# Patient Record
Sex: MALE | Race: BLACK OR AFRICAN AMERICAN | NOT HISPANIC OR LATINO | Employment: PART TIME | URBAN - METROPOLITAN AREA
[De-identification: names, ages, dates, MRNs, and addresses within clinical notes are randomized per-mention and may not be internally consistent; named-entity substitution may affect disease eponyms.]

---

## 2018-06-12 ENCOUNTER — HISTORICAL (OUTPATIENT)
Dept: ADMINISTRATIVE | Facility: HOSPITAL | Age: 53
End: 2018-06-12

## 2018-08-08 ENCOUNTER — HISTORICAL (OUTPATIENT)
Dept: ADMINISTRATIVE | Facility: HOSPITAL | Age: 53
End: 2018-08-08

## 2021-01-01 ENCOUNTER — HISTORICAL (OUTPATIENT)
Dept: ADMINISTRATIVE | Facility: HOSPITAL | Age: 56
End: 2021-01-01

## 2021-01-01 LAB — SARS-COV-2 RNA RESP QL NAA+PROBE: DETECTED

## 2022-01-01 ENCOUNTER — HOSPITAL ENCOUNTER (INPATIENT)
Facility: HOSPITAL | Age: 57
LOS: 1 days | DRG: 917 | End: 2022-11-15
Attending: EMERGENCY MEDICINE | Admitting: HOSPITALIST
Payer: MEDICAID

## 2022-01-01 ENCOUNTER — HOSPITAL ENCOUNTER (EMERGENCY)
Facility: HOSPITAL | Age: 57
Discharge: HOME | End: 2022-11-09
Attending: EMERGENCY MEDICINE
Payer: MEDICAID

## 2022-01-01 ENCOUNTER — HISTORICAL (OUTPATIENT)
Dept: ADMINISTRATIVE | Facility: HOSPITAL | Age: 57
End: 2022-01-01
Payer: MEDICAID

## 2022-01-01 ENCOUNTER — HOSPITAL ENCOUNTER (EMERGENCY)
Facility: HOSPITAL | Age: 57
Discharge: HOME | End: 2022-10-03
Attending: STUDENT IN AN ORGANIZED HEALTH CARE EDUCATION/TRAINING PROGRAM
Payer: MEDICAID

## 2022-01-01 VITALS
SYSTOLIC BLOOD PRESSURE: 151 MMHG | HEIGHT: 67 IN | DIASTOLIC BLOOD PRESSURE: 81 MMHG | SYSTOLIC BLOOD PRESSURE: 141 MMHG | BODY MASS INDEX: 31.63 KG/M2 | WEIGHT: 204.56 LBS | WEIGHT: 201.5 LBS | HEIGHT: 67 IN | BODY MASS INDEX: 32.11 KG/M2 | DIASTOLIC BLOOD PRESSURE: 88 MMHG | SYSTOLIC BLOOD PRESSURE: 142 MMHG | HEIGHT: 67 IN | WEIGHT: 188.25 LBS | BODY MASS INDEX: 29.55 KG/M2 | DIASTOLIC BLOOD PRESSURE: 76 MMHG

## 2022-01-01 VITALS
DIASTOLIC BLOOD PRESSURE: 81 MMHG | SYSTOLIC BLOOD PRESSURE: 134 MMHG | OXYGEN SATURATION: 98 % | TEMPERATURE: 99 F | RESPIRATION RATE: 16 BRPM | HEART RATE: 78 BPM

## 2022-01-01 VITALS
RESPIRATION RATE: 30 BRPM | OXYGEN SATURATION: 75 % | BODY MASS INDEX: 34.01 KG/M2 | HEIGHT: 66 IN | DIASTOLIC BLOOD PRESSURE: 37 MMHG | TEMPERATURE: 98 F | WEIGHT: 211.63 LBS | SYSTOLIC BLOOD PRESSURE: 45 MMHG

## 2022-01-01 VITALS
HEART RATE: 77 BPM | RESPIRATION RATE: 16 BRPM | TEMPERATURE: 98 F | SYSTOLIC BLOOD PRESSURE: 117 MMHG | OXYGEN SATURATION: 95 % | DIASTOLIC BLOOD PRESSURE: 72 MMHG

## 2022-01-01 DIAGNOSIS — S30.1XXA CONTUSION OF GROIN, INITIAL ENCOUNTER: Primary | ICD-10-CM

## 2022-01-01 DIAGNOSIS — R52 PAIN: ICD-10-CM
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
## 2022-01-01 DIAGNOSIS — K08.89 PAIN, DENTAL: Primary | ICD-10-CM

## 2022-01-01 DIAGNOSIS — R79.89 ELEVATED TROPONIN: ICD-10-CM

## 2022-01-01 DIAGNOSIS — K02.9 PAIN DUE TO DENTAL CARIES: ICD-10-CM

## 2022-01-01 DIAGNOSIS — K92.2 UPPER GI BLEED: ICD-10-CM

## 2022-01-01 DIAGNOSIS — S39.012A STRAIN, SACRAL, INITIAL ENCOUNTER: ICD-10-CM

## 2022-01-01 DIAGNOSIS — F14.11 HISTORY OF COCAINE ABUSE: ICD-10-CM

## 2022-01-01 DIAGNOSIS — K72.00 SHOCK LIVER: ICD-10-CM

## 2022-01-01 DIAGNOSIS — K04.7 DENTAL ABSCESS: ICD-10-CM

## 2022-01-01 DIAGNOSIS — I46.9 CARDIAC ARREST: Primary | ICD-10-CM

## 2022-01-01 DIAGNOSIS — U07.1 COVID-19 VIRUS DETECTED: ICD-10-CM

## 2022-01-01 LAB
ABORH RETYPE: NORMAL
ABS NEUT (OLG): 22.88 X10(3)/MCL (ref 2.1–9.2)
ABS NEUT (OLG): 3.85 X10(3)/MCL (ref 2.1–9.2)
ALBUMIN SERPL-MCNC: 2.5 GM/DL (ref 3.5–5)
ALBUMIN SERPL-MCNC: 2.7 GM/DL (ref 3.5–5)
ALBUMIN SERPL-MCNC: 3.7 GM/DL (ref 3.5–5)
ALBUMIN/GLOB SERPL: 0.8 RATIO (ref 1.1–2)
ALBUMIN/GLOB SERPL: 0.9 RATIO (ref 1.1–2)
ALBUMIN/GLOB SERPL: 1.2 RATIO (ref 1.1–2)
ALP SERPL-CCNC: 118 UNIT/L (ref 40–150)
ALP SERPL-CCNC: 192 UNIT/L (ref 40–150)
ALP SERPL-CCNC: 73 UNIT/L (ref 40–150)
ALT SERPL-CCNC: 16 UNIT/L (ref 0–55)
ALT SERPL-CCNC: 2898 UNIT/L (ref 0–55)
ALT SERPL-CCNC: 3362 UNIT/L (ref 0–55)
AMPHET UR QL SCN: NEGATIVE
ANION GAP SERPL CALC-SCNC: 21 MMOL/L (ref 8–16)
ANION GAP SERPL CALC-SCNC: 25 MEQ/L
APPEARANCE UR: CLEAR
APTT PPP: 197.1 SECONDS (ref 23.2–33.7)
AST SERPL-CCNC: 22 UNIT/L (ref 5–34)
AST SERPL-CCNC: 2537 UNIT/L (ref 5–34)
AST SERPL-CCNC: 3319 UNIT/L (ref 5–34)
BACTERIA #/AREA URNS AUTO: NORMAL /HPF
BARBITURATE SCN PRESENT UR: NEGATIVE
BASOPHILS # BLD AUTO: 0.19 X10(3)/MCL (ref 0–0.2)
BASOPHILS NFR BLD AUTO: 1.3 %
BENZODIAZ UR QL SCN: POSITIVE
BILIRUB UR QL STRIP.AUTO: NEGATIVE MG/DL
BILIRUBIN DIRECT+TOT PNL SERPL-MCNC: 0.3 MG/DL
BILIRUBIN DIRECT+TOT PNL SERPL-MCNC: 0.5 MG/DL
BILIRUBIN DIRECT+TOT PNL SERPL-MCNC: 0.6 MG/DL
BUN SERPL-MCNC: 11 MG/DL (ref 6–30)
BUN SERPL-MCNC: 15.3 MG/DL (ref 8.4–25.7)
BUN SERPL-MCNC: 15.4 MG/DL (ref 8.4–25.7)
BUN SERPL-MCNC: 17.7 MG/DL (ref 8.4–25.7)
BUN SERPL-MCNC: 9.7 MG/DL (ref 8.4–25.7)
CALCIUM SERPL-MCNC: 7.1 MG/DL (ref 8.4–10.2)
CALCIUM SERPL-MCNC: 7.6 MG/DL (ref 8.4–10.2)
CALCIUM SERPL-MCNC: 8.6 MG/DL (ref 8.4–10.2)
CALCIUM SERPL-MCNC: 9.2 MG/DL (ref 8.4–10.2)
CANNABINOIDS UR QL SCN: NEGATIVE
CHLORIDE SERPL-SCNC: 101 MMOL/L (ref 95–110)
CHLORIDE SERPL-SCNC: 103 MMOL/L (ref 98–107)
CHLORIDE SERPL-SCNC: 103 MMOL/L (ref 98–107)
CHLORIDE SERPL-SCNC: 107 MMOL/L (ref 98–107)
CHLORIDE SERPL-SCNC: 108 MMOL/L (ref 98–107)
CK SERPL-CCNC: 670 U/L (ref 30–200)
CO2 SERPL-SCNC: 10 MMOL/L (ref 22–29)
CO2 SERPL-SCNC: 15 MMOL/L (ref 22–29)
CO2 SERPL-SCNC: 25 MMOL/L (ref 22–29)
CO2 SERPL-SCNC: 8 MMOL/L (ref 22–29)
COCAINE UR QL SCN: POSITIVE
COLOR UR AUTO: YELLOW
CORRECTED TEMPERATURE (PCO2): 34 MMHG (ref 35–45)
CORRECTED TEMPERATURE (PCO2): 64 MMHG (ref 19–50)
CORRECTED TEMPERATURE (PH): 6.92 (ref 7.29–7.61)
CORRECTED TEMPERATURE (PH): 6.93 (ref 7.29–7.61)
CORRECTED TEMPERATURE (PO2): 142 MMHG (ref 80–100)
CORRECTED TEMPERATURE (PO2): 243 MMHG (ref 80–100)
CREAT SERPL-MCNC: 0.75 MG/DL (ref 0.73–1.18)
CREAT SERPL-MCNC: 1.3 MG/DL (ref 0.5–1.4)
CREAT SERPL-MCNC: 1.33 MG/DL (ref 0.73–1.18)
CREAT SERPL-MCNC: 1.52 MG/DL (ref 0.73–1.18)
CREAT SERPL-MCNC: 2.1 MG/DL (ref 0.73–1.18)
CREAT/UREA NIT SERPL: 8
EOSINOPHIL # BLD AUTO: 0.58 X10(3)/MCL (ref 0–0.9)
EOSINOPHIL NFR BLD AUTO: 4 %
EOSINOPHIL NFR BLD MANUAL: 0.35 X10(3)/MCL (ref 0–0.9)
EOSINOPHIL NFR BLD MANUAL: 2 %
ERYTHROCYTE [DISTWIDTH] IN BLOOD BY AUTOMATED COUNT: 13.2 % (ref 11.5–17)
ERYTHROCYTE [DISTWIDTH] IN BLOOD BY AUTOMATED COUNT: 13.6 % (ref 11.5–17)
ERYTHROCYTE [DISTWIDTH] IN BLOOD BY AUTOMATED COUNT: 14.4 % (ref 11.5–17)
ETHANOL SERPL-MCNC: 28 MG/DL
FENTANYL UR QL SCN: NEGATIVE
GFR SERPLBLD CREATININE-BSD FMLA CKD-EPI: 36 MLS/MIN/1.73/M2
GFR SERPLBLD CREATININE-BSD FMLA CKD-EPI: 54 MLS/MIN/1.73/M2
GFR SERPLBLD CREATININE-BSD FMLA CKD-EPI: >60 MLS/MIN/1.73/M2
GFR SERPLBLD CREATININE-BSD FMLA CKD-EPI: >60 MLS/MIN/1.73/M2
GLOBULIN SER-MCNC: 2.7 GM/DL (ref 2.4–3.5)
GLOBULIN SER-MCNC: 3.2 GM/DL (ref 2.4–3.5)
GLOBULIN SER-MCNC: 3.4 GM/DL (ref 2.4–3.5)
GLUCOSE SERPL-MCNC: 106 MG/DL (ref 74–100)
GLUCOSE SERPL-MCNC: 116 MG/DL (ref 74–100)
GLUCOSE SERPL-MCNC: 230 MG/DL (ref 74–100)
GLUCOSE SERPL-MCNC: 245 MG/DL (ref 74–100)
GLUCOSE SERPL-MCNC: 83 MG/DL (ref 74–100)
GLUCOSE SERPL-MCNC: 90 MG/DL (ref 70–110)
GLUCOSE UR QL STRIP.AUTO: NEGATIVE MG/DL
GROUP & RH: NORMAL
HCO3 UR-SCNC: 13.4 MMOL/L
HCO3 UR-SCNC: 7 MMOL/L
HCT VFR BLD AUTO: 36.1 % (ref 42–52)
HCT VFR BLD AUTO: 38 % (ref 42–52)
HCT VFR BLD AUTO: 39.1 % (ref 42–52)
HCT VFR BLD AUTO: 41.8 % (ref 42–52)
HCT VFR BLD CALC: 42 %PCV (ref 36–54)
HGB BLD-MCNC: 11.1 GM/DL (ref 14–18)
HGB BLD-MCNC: 11.4 G/DL (ref 12–16)
HGB BLD-MCNC: 11.8 G/DL (ref 12–16)
HGB BLD-MCNC: 12.3 GM/DL (ref 14–18)
HGB BLD-MCNC: 12.7 GM/DL (ref 14–18)
HGB BLD-MCNC: 13.2 GM/DL (ref 14–18)
HGB BLD-MCNC: 14 G/DL
IMM GRANULOCYTES # BLD AUTO: 0.09 X10(3)/MCL (ref 0–0.04)
IMM GRANULOCYTES # BLD AUTO: 0.47 X10(3)/MCL (ref 0–0.04)
IMM GRANULOCYTES # BLD AUTO: 1.39 X10(3)/MCL (ref 0–0.04)
IMM GRANULOCYTES NFR BLD AUTO: 0.6 %
IMM GRANULOCYTES NFR BLD AUTO: 1.8 %
IMM GRANULOCYTES NFR BLD AUTO: 7.9 %
INDIRECT COOMBS GEL: NORMAL
INR BLD: 5.71 (ref 0–1.3)
INSTRUMENT WBC (OLG): 17.5 X10(3)/MCL
INSTRUMENT WBC (OLG): 26 X10(3)/MCL
KETONES UR QL STRIP.AUTO: NEGATIVE MG/DL
LACTATE SERPL-SCNC: 16.1 MMOL/L (ref 0.5–2.2)
LACTATE SERPL-SCNC: 17 MMOL/L (ref 0.5–2.2)
LACTATE SERPL-SCNC: 18.6 MMOL/L (ref 0.5–2.2)
LACTATE SERPL-SCNC: 9.7 MMOL/L (ref 0.5–2.2)
LEUKOCYTE ESTERASE UR QL STRIP.AUTO: NEGATIVE UNIT/L
LYMPHOCYTES # BLD AUTO: 4.23 X10(3)/MCL (ref 0.6–4.6)
LYMPHOCYTES NFR BLD AUTO: 29.3 %
LYMPHOCYTES NFR BLD MANUAL: 1.56 X10(3)/MCL
LYMPHOCYTES NFR BLD MANUAL: 13.12 X10(3)/MCL
LYMPHOCYTES NFR BLD MANUAL: 6 %
LYMPHOCYTES NFR BLD MANUAL: 75 %
MACROCYTES BLD QL SMEAR: ABNORMAL
MAGNESIUM SERPL-MCNC: 2.5 MG/DL (ref 1.6–2.6)
MCH RBC QN AUTO: 30.8 PG (ref 27–31)
MCH RBC QN AUTO: 30.9 PG (ref 27–31)
MCH RBC QN AUTO: 31.2 PG (ref 27–31)
MCHC RBC AUTO-ENTMCNC: 30.4 MG/DL (ref 33–36)
MCHC RBC AUTO-ENTMCNC: 30.7 MG/DL (ref 33–36)
MCHC RBC AUTO-ENTMCNC: 33.8 MG/DL (ref 33–36)
MCV RBC AUTO: 100.6 FL (ref 80–94)
MCV RBC AUTO: 101.2 FL (ref 80–94)
MCV RBC AUTO: 92.4 FL (ref 80–94)
MDMA UR QL SCN: NEGATIVE
METAMYELOCYTES NFR BLD MANUAL: 3 %
MONOCYTES # BLD AUTO: 1.24 X10(3)/MCL (ref 0.1–1.3)
MONOCYTES NFR BLD AUTO: 8.6 %
MONOCYTES NFR BLD MANUAL: 0.17 X10(3)/MCL (ref 0.1–1.3)
MONOCYTES NFR BLD MANUAL: 1 %
MONOCYTES NFR BLD MANUAL: 1.82 X10(3)/MCL (ref 0.1–1.3)
MONOCYTES NFR BLD MANUAL: 7 %
MYELOCYTES NFR BLD MANUAL: 1 %
MYELOCYTES NFR BLD MANUAL: 1 %
NEUTROPHILS # BLD AUTO: 8.1 X10(3)/MCL (ref 2.1–9.2)
NEUTROPHILS NFR BLD AUTO: 56.2 %
NEUTROPHILS NFR BLD MANUAL: 21 %
NEUTROPHILS NFR BLD MANUAL: 84 %
NITRITE UR QL STRIP.AUTO: NEGATIVE
NRBC BLD AUTO-RTO: 0 %
NRBC BLD AUTO-RTO: 0.5 %
NRBC BLD AUTO-RTO: 0.7 %
OPIATES UR QL SCN: NEGATIVE
PCO2 BLDA: 34 MMHG (ref 35–45)
PCO2 BLDA: 64 MMHG (ref 19–50)
PCP UR QL: NEGATIVE
PH SMN: 6.92 [PH] (ref 7.29–7.61)
PH SMN: 6.93 [PH] (ref 7.29–7.61)
PH UR STRIP.AUTO: 5.5 [PH]
PH UR: 5.5 [PH] (ref 3–11)
PHOSPHATE SERPL-MCNC: 10.3 MG/DL (ref 2.3–4.7)
PLATELET # BLD AUTO: 144 X10(3)/MCL (ref 130–400)
PLATELET # BLD AUTO: 269 X10(3)/MCL (ref 130–400)
PLATELET # BLD AUTO: 354 X10(3)/MCL (ref 130–400)
PLATELET # BLD EST: ADEQUATE 10*3/UL
PLATELET # BLD EST: NORMAL 10*3/UL
PMV BLD AUTO: 10.3 FL (ref 7.4–10.4)
PMV BLD AUTO: 11.4 FL (ref 7.4–10.4)
PMV BLD AUTO: 9.6 FL (ref 7.4–10.4)
PO2 BLDA: 142 MMHG (ref 80–100)
PO2 BLDA: 243 MMHG (ref 80–100)
POC BASE DEFICIT: -19.1 MMOL/L (ref -2–2)
POC BASE DEFICIT: -24.7 MMOL/L (ref -2–2)
POC COHB: 0.9 %
POC COHB: 8.1 %
POC IONIZED CALCIUM: 0.92 MMOL/L (ref 1.1–1.2)
POC IONIZED CALCIUM: 1.07 MMOL/L (ref 1.06–1.42)
POC IONIZED CALCIUM: 1.14 MMOL/L (ref 1.12–1.23)
POC METHB: 0.3 % (ref 0.4–1.5)
POC METHB: 0.3 % (ref 0.4–1.5)
POC O2HB: 90.7 % (ref 94–97)
POC O2HB: 97.3 % (ref 94–97)
POC SATURATED O2: 97.1 %
POC SATURATED O2: 99.5 %
POC TCO2 (MEASURED): 25 MMOL/L (ref 23–29)
POC TEMPERATURE: 37 °C
POC TEMPERATURE: 37 °C
POCT GLUCOSE: 97 MG/DL (ref 70–110)
POTASSIUM BLD-SCNC: 2.7 MMOL/L (ref 3.5–5)
POTASSIUM BLD-SCNC: 3.7 MMOL/L (ref 3.5–5)
POTASSIUM BLD-SCNC: 3.7 MMOL/L (ref 3.5–5.1)
POTASSIUM SERPL-SCNC: 3.6 MMOL/L (ref 3.5–5.1)
POTASSIUM SERPL-SCNC: 3.8 MMOL/L (ref 3.5–5.1)
POTASSIUM SERPL-SCNC: 4 MMOL/L (ref 3.5–5.1)
POTASSIUM SERPL-SCNC: 4.2 MMOL/L (ref 3.5–5.1)
PROT SERPL-MCNC: 5.2 GM/DL (ref 6.4–8.3)
PROT SERPL-MCNC: 6.1 GM/DL (ref 6.4–8.3)
PROT SERPL-MCNC: 6.9 GM/DL (ref 6.4–8.3)
PROT UR QL STRIP.AUTO: NEGATIVE MG/DL
PROTHROMBIN TIME: 50 SECONDS (ref 12.5–14.5)
RBC # BLD AUTO: 3.59 X10(6)/MCL (ref 4.7–6.1)
RBC # BLD AUTO: 4.13 X10(6)/MCL (ref 4.7–6.1)
RBC # BLD AUTO: 4.23 X10(6)/MCL (ref 4.7–6.1)
RBC #/AREA URNS AUTO: <5 /HPF
RBC MORPH BLD: ABNORMAL
RBC MORPH BLD: NORMAL
RBC UR QL AUTO: ABNORMAL UNIT/L
SAMPLE: ABNORMAL
SODIUM BLD-SCNC: 132 MMOL/L (ref 137–145)
SODIUM BLD-SCNC: 140 MMOL/L (ref 137–145)
SODIUM BLD-SCNC: 142 MMOL/L (ref 136–145)
SODIUM SERPL-SCNC: 136 MMOL/L (ref 136–145)
SODIUM SERPL-SCNC: 138 MMOL/L (ref 136–145)
SODIUM SERPL-SCNC: 138 MMOL/L (ref 136–145)
SODIUM SERPL-SCNC: 144 MMOL/L (ref 136–145)
SP GR UR STRIP.AUTO: 1.02 (ref 1–1.03)
SPECIFIC GRAVITY, URINE AUTO (.000) (OHS): 1.02 (ref 1–1.03)
SPECIMEN SOURCE: ABNORMAL
SPECIMEN SOURCE: ABNORMAL
SQUAMOUS #/AREA URNS AUTO: <5 /HPF
TROPONIN I SERPL-MCNC: 0.2 NG/ML (ref 0–0.04)
TROPONIN I SERPL-MCNC: 195.03 NG/ML (ref 0–0.04)
TROPONIN I SERPL-MCNC: 66.86 NG/ML (ref 0–0.04)
UROBILINOGEN UR STRIP-ACNC: 0.2 MG/DL
WBC # SPEC AUTO: 14.5 X10(3)/MCL (ref 4.5–11.5)
WBC # SPEC AUTO: 17.5 X10(3)/MCL (ref 4.5–11.5)
WBC # SPEC AUTO: 26.1 X10(3)/MCL (ref 4.5–11.5)
WBC #/AREA URNS AUTO: <5 /HPF

## 2022-01-01 PROCEDURE — P9016 RBC LEUKOCYTES REDUCED: HCPCS | Performed by: EMERGENCY MEDICINE

## 2022-01-01 PROCEDURE — 93010 EKG 12-LEAD: ICD-10-PCS | Mod: 76,,, | Performed by: INTERNAL MEDICINE

## 2022-01-01 PROCEDURE — 63600175 PHARM REV CODE 636 W HCPCS: Performed by: INTERNAL MEDICINE

## 2022-01-01 PROCEDURE — 94761 N-INVAS EAR/PLS OXIMETRY MLT: CPT

## 2022-01-01 PROCEDURE — 63600175 PHARM REV CODE 636 W HCPCS: Performed by: HOSPITALIST

## 2022-01-01 PROCEDURE — 36569 INSJ PICC 5 YR+ W/O IMAGING: CPT

## 2022-01-01 PROCEDURE — 36415 COLL VENOUS BLD VENIPUNCTURE: CPT | Performed by: STUDENT IN AN ORGANIZED HEALTH CARE EDUCATION/TRAINING PROGRAM

## 2022-01-01 PROCEDURE — 83605 ASSAY OF LACTIC ACID: CPT | Performed by: STUDENT IN AN ORGANIZED HEALTH CARE EDUCATION/TRAINING PROGRAM

## 2022-01-01 PROCEDURE — 82803 BLOOD GASES ANY COMBINATION: CPT

## 2022-01-01 PROCEDURE — 63600175 PHARM REV CODE 636 W HCPCS: Performed by: EMERGENCY MEDICINE

## 2022-01-01 PROCEDURE — 27200966 HC CLOSED SUCTION SYSTEM

## 2022-01-01 PROCEDURE — 25000003 PHARM REV CODE 250: Performed by: EMERGENCY MEDICINE

## 2022-01-01 PROCEDURE — 86923 COMPATIBILITY TEST ELECTRIC: CPT | Performed by: EMERGENCY MEDICINE

## 2022-01-01 PROCEDURE — C1751 CATH, INF, PER/CENT/MIDLINE: HCPCS

## 2022-01-01 PROCEDURE — 63600175 PHARM REV CODE 636 W HCPCS: Mod: JA | Performed by: EMERGENCY MEDICINE

## 2022-01-01 PROCEDURE — C9113 INJ PANTOPRAZOLE SODIUM, VIA: HCPCS | Performed by: EMERGENCY MEDICINE

## 2022-01-01 PROCEDURE — 85027 COMPLETE CBC AUTOMATED: CPT | Performed by: EMERGENCY MEDICINE

## 2022-01-01 PROCEDURE — 85014 HEMATOCRIT: CPT | Performed by: STUDENT IN AN ORGANIZED HEALTH CARE EDUCATION/TRAINING PROGRAM

## 2022-01-01 PROCEDURE — 84100 ASSAY OF PHOSPHORUS: CPT | Performed by: STUDENT IN AN ORGANIZED HEALTH CARE EDUCATION/TRAINING PROGRAM

## 2022-01-01 PROCEDURE — 93010 ELECTROCARDIOGRAM REPORT: CPT | Mod: ,,, | Performed by: INTERNAL MEDICINE

## 2022-01-01 PROCEDURE — 82947 ASSAY GLUCOSE BLOOD QUANT: CPT | Performed by: STUDENT IN AN ORGANIZED HEALTH CARE EDUCATION/TRAINING PROGRAM

## 2022-01-01 PROCEDURE — 94003 VENT MGMT INPAT SUBQ DAY: CPT

## 2022-01-01 PROCEDURE — 99900026 HC AIRWAY MAINTENANCE (STAT)

## 2022-01-01 PROCEDURE — 80053 COMPREHEN METABOLIC PANEL: CPT | Performed by: EMERGENCY MEDICINE

## 2022-01-01 PROCEDURE — 99284 EMERGENCY DEPT VISIT MOD MDM: CPT

## 2022-01-01 PROCEDURE — 27201640 HC PAD, ARTICGEL

## 2022-01-01 PROCEDURE — 31500 INSERT EMERGENCY AIRWAY: CPT

## 2022-01-01 PROCEDURE — 25000003 PHARM REV CODE 250: Performed by: STUDENT IN AN ORGANIZED HEALTH CARE EDUCATION/TRAINING PROGRAM

## 2022-01-01 PROCEDURE — 94002 VENT MGMT INPAT INIT DAY: CPT

## 2022-01-01 PROCEDURE — 99900035 HC TECH TIME PER 15 MIN (STAT)

## 2022-01-01 PROCEDURE — 63600175 PHARM REV CODE 636 W HCPCS: Performed by: STUDENT IN AN ORGANIZED HEALTH CARE EDUCATION/TRAINING PROGRAM

## 2022-01-01 PROCEDURE — 86850 RBC ANTIBODY SCREEN: CPT | Performed by: EMERGENCY MEDICINE

## 2022-01-01 PROCEDURE — 85025 COMPLETE CBC W/AUTO DIFF WBC: CPT | Performed by: PHYSICIAN ASSISTANT

## 2022-01-01 PROCEDURE — A4216 STERILE WATER/SALINE, 10 ML: HCPCS | Performed by: STUDENT IN AN ORGANIZED HEALTH CARE EDUCATION/TRAINING PROGRAM

## 2022-01-01 PROCEDURE — 84484 ASSAY OF TROPONIN QUANT: CPT | Performed by: STUDENT IN AN ORGANIZED HEALTH CARE EDUCATION/TRAINING PROGRAM

## 2022-01-01 PROCEDURE — 85027 COMPLETE CBC AUTOMATED: CPT | Performed by: STUDENT IN AN ORGANIZED HEALTH CARE EDUCATION/TRAINING PROGRAM

## 2022-01-01 PROCEDURE — 80053 COMPREHEN METABOLIC PANEL: CPT | Performed by: PHYSICIAN ASSISTANT

## 2022-01-01 PROCEDURE — 80053 COMPREHEN METABOLIC PANEL: CPT | Performed by: STUDENT IN AN ORGANIZED HEALTH CARE EDUCATION/TRAINING PROGRAM

## 2022-01-01 PROCEDURE — 36600 WITHDRAWAL OF ARTERIAL BLOOD: CPT

## 2022-01-01 PROCEDURE — 82962 GLUCOSE BLOOD TEST: CPT

## 2022-01-01 PROCEDURE — 25000003 PHARM REV CODE 250

## 2022-01-01 PROCEDURE — 36592 COLLECT BLOOD FROM PICC: CPT | Performed by: STUDENT IN AN ORGANIZED HEALTH CARE EDUCATION/TRAINING PROGRAM

## 2022-01-01 PROCEDURE — 82077 ASSAY SPEC XCP UR&BREATH IA: CPT | Performed by: EMERGENCY MEDICINE

## 2022-01-01 PROCEDURE — 85730 THROMBOPLASTIN TIME PARTIAL: CPT | Performed by: EMERGENCY MEDICINE

## 2022-01-01 PROCEDURE — 27000221 HC OXYGEN, UP TO 24 HOURS

## 2022-01-01 PROCEDURE — 85018 HEMOGLOBIN: CPT | Performed by: STUDENT IN AN ORGANIZED HEALTH CARE EDUCATION/TRAINING PROGRAM

## 2022-01-01 PROCEDURE — 25000003 PHARM REV CODE 250: Performed by: INTERNAL MEDICINE

## 2022-01-01 PROCEDURE — 96375 TX/PRO/DX INJ NEW DRUG ADDON: CPT

## 2022-01-01 PROCEDURE — 96372 THER/PROPH/DIAG INJ SC/IM: CPT | Performed by: EMERGENCY MEDICINE

## 2022-01-01 PROCEDURE — 85610 PROTHROMBIN TIME: CPT | Performed by: EMERGENCY MEDICINE

## 2022-01-01 PROCEDURE — 37799 UNLISTED PX VASCULAR SURGERY: CPT

## 2022-01-01 PROCEDURE — 96374 THER/PROPH/DIAG INJ IV PUSH: CPT

## 2022-01-01 PROCEDURE — 99291 CRITICAL CARE FIRST HOUR: CPT | Mod: 25

## 2022-01-01 PROCEDURE — 82550 ASSAY OF CK (CPK): CPT | Performed by: STUDENT IN AN ORGANIZED HEALTH CARE EDUCATION/TRAINING PROGRAM

## 2022-01-01 PROCEDURE — 99285 EMERGENCY DEPT VISIT HI MDM: CPT | Mod: 25

## 2022-01-01 PROCEDURE — 20000000 HC ICU ROOM

## 2022-01-01 PROCEDURE — 80048 BASIC METABOLIC PNL TOTAL CA: CPT | Performed by: STUDENT IN AN ORGANIZED HEALTH CARE EDUCATION/TRAINING PROGRAM

## 2022-01-01 PROCEDURE — 36430 TRANSFUSION BLD/BLD COMPNT: CPT

## 2022-01-01 PROCEDURE — 93005 ELECTROCARDIOGRAM TRACING: CPT

## 2022-01-01 PROCEDURE — 84484 ASSAY OF TROPONIN QUANT: CPT | Performed by: EMERGENCY MEDICINE

## 2022-01-01 PROCEDURE — 81001 URINALYSIS AUTO W/SCOPE: CPT | Performed by: PHYSICIAN ASSISTANT

## 2022-01-01 PROCEDURE — 80307 DRUG TEST PRSMV CHEM ANLYZR: CPT | Performed by: EMERGENCY MEDICINE

## 2022-01-01 PROCEDURE — 25500020 PHARM REV CODE 255: Performed by: INTERNAL MEDICINE

## 2022-01-01 PROCEDURE — 80047 BASIC METABLC PNL IONIZED CA: CPT

## 2022-01-01 PROCEDURE — 93010 ELECTROCARDIOGRAM REPORT: CPT | Mod: 76,,, | Performed by: INTERNAL MEDICINE

## 2022-01-01 PROCEDURE — 83735 ASSAY OF MAGNESIUM: CPT | Performed by: STUDENT IN AN ORGANIZED HEALTH CARE EDUCATION/TRAINING PROGRAM

## 2022-01-01 RX ORDER — EPINEPHRINE 1 MG/ML
INJECTION, SOLUTION, CONCENTRATE INTRAVENOUS
Status: DISPENSED
Start: 2022-01-01 | End: 2022-01-01

## 2022-01-01 RX ORDER — CHLORHEXIDINE GLUCONATE ORAL RINSE 1.2 MG/ML
15 SOLUTION DENTAL 2 TIMES DAILY
Qty: 473 ML | Refills: 0 | Status: SHIPPED | OUTPATIENT
Start: 2022-01-01 | End: 2022-01-01

## 2022-01-01 RX ORDER — HYDROCODONE BITARTRATE AND ACETAMINOPHEN 7.5; 325 MG/1; MG/1
1 TABLET ORAL EVERY 6 HOURS PRN
Status: DISCONTINUED | OUTPATIENT
Start: 2022-01-01 | End: 2022-01-01 | Stop reason: HOSPADM

## 2022-01-01 RX ORDER — HYDROCODONE BITARTRATE AND ACETAMINOPHEN 5; 325 MG/1; MG/1
1 TABLET ORAL EVERY 6 HOURS PRN
Qty: 12 TABLET | Refills: 0 | Status: SHIPPED | OUTPATIENT
Start: 2022-01-01

## 2022-01-01 RX ORDER — MORPHINE SULFATE 4 MG/ML
2 INJECTION, SOLUTION INTRAMUSCULAR; INTRAVENOUS EVERY 30 MIN PRN
Status: DISCONTINUED | OUTPATIENT
Start: 2022-01-01 | End: 2022-01-01 | Stop reason: HOSPADM

## 2022-01-01 RX ORDER — AMOXICILLIN AND CLAVULANATE POTASSIUM 875; 125 MG/1; MG/1
1 TABLET, FILM COATED ORAL EVERY 12 HOURS
Qty: 14 TABLET | Refills: 0 | Status: SHIPPED | OUTPATIENT
Start: 2022-01-01 | End: 2022-01-01

## 2022-01-01 RX ORDER — NOREPINEPHRINE BITARTRATE/D5W 8 MG/250ML
0-3 PLASTIC BAG, INJECTION (ML) INTRAVENOUS CONTINUOUS
Status: DISCONTINUED | OUTPATIENT
Start: 2022-01-01 | End: 2022-01-01

## 2022-01-01 RX ORDER — SODIUM CHLORIDE 0.9 % (FLUSH) 0.9 %
10 SYRINGE (ML) INJECTION EVERY 6 HOURS
Status: DISCONTINUED | OUTPATIENT
Start: 2022-01-01 | End: 2022-01-01 | Stop reason: HOSPADM

## 2022-01-01 RX ORDER — MORPHINE SULFATE 4 MG/ML
INJECTION, SOLUTION INTRAMUSCULAR; INTRAVENOUS
Status: DISCONTINUED
Start: 2022-01-01 | End: 2022-01-01 | Stop reason: HOSPADM

## 2022-01-01 RX ORDER — OCTREOTIDE ACETATE 500 UG/ML
100 INJECTION, SOLUTION INTRAVENOUS; SUBCUTANEOUS
Status: COMPLETED | OUTPATIENT
Start: 2022-01-01 | End: 2022-01-01

## 2022-01-01 RX ORDER — SODIUM BICARBONATE 1 MEQ/ML
SYRINGE (ML) INTRAVENOUS
Status: DISPENSED
Start: 2022-01-01 | End: 2022-01-01

## 2022-01-01 RX ORDER — SODIUM CHLORIDE 0.9 % (FLUSH) 0.9 %
10 SYRINGE (ML) INJECTION
Status: DISCONTINUED | OUTPATIENT
Start: 2022-01-01 | End: 2022-01-01 | Stop reason: HOSPADM

## 2022-01-01 RX ORDER — MUPIROCIN 20 MG/G
OINTMENT TOPICAL 2 TIMES DAILY
Status: DISCONTINUED | OUTPATIENT
Start: 2022-01-01 | End: 2022-01-01 | Stop reason: HOSPADM

## 2022-01-01 RX ORDER — EPINEPHRINE 0.1 MG/ML
INJECTION INTRAVENOUS CODE/TRAUMA/SEDATION MEDICATION
Status: COMPLETED | OUTPATIENT
Start: 2022-01-01 | End: 2022-01-01

## 2022-01-01 RX ORDER — HYDROCODONE BITARTRATE AND ACETAMINOPHEN 5; 325 MG/1; MG/1
1 TABLET ORAL EVERY 8 HOURS PRN
Qty: 15 TABLET | Refills: 0 | Status: SHIPPED | OUTPATIENT
Start: 2022-01-01 | End: 2022-01-01

## 2022-01-01 RX ORDER — KETOROLAC TROMETHAMINE 10 MG/1
10 TABLET, FILM COATED ORAL EVERY 6 HOURS
Qty: 20 TABLET | Refills: 0 | Status: SHIPPED | OUTPATIENT
Start: 2022-01-01 | End: 2022-01-01

## 2022-01-01 RX ORDER — HALOPERIDOL 5 MG/ML
1 INJECTION INTRAMUSCULAR EVERY 4 HOURS PRN
Status: DISCONTINUED | OUTPATIENT
Start: 2022-01-01 | End: 2022-01-01 | Stop reason: HOSPADM

## 2022-01-01 RX ORDER — INDOMETHACIN 25 MG/1
100 CAPSULE ORAL ONCE
Status: DISCONTINUED | OUTPATIENT
Start: 2022-01-01 | End: 2022-01-01

## 2022-01-01 RX ORDER — GLYCOPYRROLATE 1 MG/1
1 TABLET ORAL 3 TIMES DAILY PRN
Status: DISCONTINUED | OUTPATIENT
Start: 2022-01-01 | End: 2022-01-01 | Stop reason: HOSPADM

## 2022-01-01 RX ORDER — NOREPINEPHRINE BITARTRATE/D5W 8 MG/250ML
PLASTIC BAG, INJECTION (ML) INTRAVENOUS
Status: COMPLETED
Start: 2022-01-01 | End: 2022-01-01

## 2022-01-01 RX ORDER — HYDROMORPHONE HYDROCHLORIDE 2 MG/ML
0.2 INJECTION, SOLUTION INTRAMUSCULAR; INTRAVENOUS; SUBCUTANEOUS
Status: DISCONTINUED | OUTPATIENT
Start: 2022-01-01 | End: 2022-01-01 | Stop reason: HOSPADM

## 2022-01-01 RX ORDER — ENOXAPARIN SODIUM 100 MG/ML
40 INJECTION SUBCUTANEOUS EVERY 24 HOURS
Status: DISCONTINUED | OUTPATIENT
Start: 2022-01-01 | End: 2022-01-01 | Stop reason: HOSPADM

## 2022-01-01 RX ORDER — HYDROCODONE BITARTRATE AND ACETAMINOPHEN 500; 5 MG/1; MG/1
TABLET ORAL
Status: DISCONTINUED | OUTPATIENT
Start: 2022-01-01 | End: 2022-01-01 | Stop reason: HOSPADM

## 2022-01-01 RX ORDER — KETOROLAC TROMETHAMINE 30 MG/ML
30 INJECTION, SOLUTION INTRAMUSCULAR; INTRAVENOUS
Status: COMPLETED | OUTPATIENT
Start: 2022-01-01 | End: 2022-01-01

## 2022-01-01 RX ORDER — IBUPROFEN 600 MG/1
600 TABLET ORAL EVERY 6 HOURS PRN
Qty: 15 TABLET | Refills: 0 | Status: SHIPPED | OUTPATIENT
Start: 2022-01-01 | End: 2022-01-01

## 2022-01-01 RX ORDER — PANTOPRAZOLE SODIUM 40 MG/10ML
80 INJECTION, POWDER, LYOPHILIZED, FOR SOLUTION INTRAVENOUS
Status: COMPLETED | OUTPATIENT
Start: 2022-01-01 | End: 2022-01-01

## 2022-01-01 RX ADMIN — EPINEPHRINE 0.1 MG: 0.1 INJECTION, SOLUTION INTRAVENOUS at 03:11

## 2022-01-01 RX ADMIN — EPINEPHRINE 0.2 MCG/KG/MIN: 1 INJECTION INTRAMUSCULAR; INTRAVENOUS; SUBCUTANEOUS at 12:11

## 2022-01-01 RX ADMIN — KETOROLAC TROMETHAMINE 30 MG: 30 INJECTION, SOLUTION INTRAMUSCULAR; INTRAVENOUS at 12:11

## 2022-01-01 RX ADMIN — OCTREOTIDE ACETATE 100 MCG: 500 INJECTION, SOLUTION INTRAVENOUS; SUBCUTANEOUS at 04:11

## 2022-01-01 RX ADMIN — VASOPRESSIN 0.04 UNITS/MIN: 20 INJECTION INTRAVENOUS at 08:11

## 2022-01-01 RX ADMIN — SODIUM CHLORIDE, POTASSIUM CHLORIDE, SODIUM LACTATE AND CALCIUM CHLORIDE 1000 ML: 600; 310; 30; 20 INJECTION, SOLUTION INTRAVENOUS at 07:11

## 2022-01-01 RX ADMIN — NOREPINEPHRINE BITARTRATE 1.4 MCG/KG/MIN: 8 INJECTION, SOLUTION INTRAVENOUS at 07:11

## 2022-01-01 RX ADMIN — MORPHINE SULFATE 2 MG: 4 INJECTION INTRAVENOUS at 01:11

## 2022-01-01 RX ADMIN — NOREPINEPHRINE BITARTRATE 3 MCG/KG/MIN: 1 INJECTION, SOLUTION, CONCENTRATE INTRAVENOUS at 11:11

## 2022-01-01 RX ADMIN — NOREPINEPHRINE BITARTRATE 1.4 MCG/KG/MIN: 8 INJECTION, SOLUTION INTRAVENOUS at 08:11

## 2022-01-01 RX ADMIN — NOREPINEPHRINE BITARTRATE 2.7 MCG/KG/MIN: 1 INJECTION, SOLUTION, CONCENTRATE INTRAVENOUS at 12:11

## 2022-01-01 RX ADMIN — PANTOPRAZOLE SODIUM 8 MG/HR: 40 INJECTION, POWDER, FOR SOLUTION INTRAVENOUS at 05:11

## 2022-01-01 RX ADMIN — EPINEPHRINE 1 MCG/KG/MIN: 1 INJECTION INTRAMUSCULAR; INTRAVENOUS; SUBCUTANEOUS at 07:11

## 2022-01-01 RX ADMIN — VASOPRESSIN 0.04 UNITS/MIN: 20 INJECTION INTRAVENOUS at 12:11

## 2022-01-01 RX ADMIN — NOREPINEPHRINE BITARTRATE 3 MCG/KG/MIN: 1 INJECTION, SOLUTION, CONCENTRATE INTRAVENOUS at 05:11

## 2022-01-01 RX ADMIN — EPINEPHRINE 0.5 MCG/KG/MIN: 1 INJECTION INTRAMUSCULAR; INTRAVENOUS; SUBCUTANEOUS at 05:11

## 2022-01-01 RX ADMIN — IOPAMIDOL 100 ML: 755 INJECTION, SOLUTION INTRAVENOUS at 05:11

## 2022-01-01 RX ADMIN — NOREPINEPHRINE BITARTRATE 3 MCG/KG/MIN: 1 INJECTION, SOLUTION, CONCENTRATE INTRAVENOUS at 07:11

## 2022-01-01 RX ADMIN — IOPAMIDOL 100 ML: 755 INJECTION, SOLUTION INTRAVENOUS at 06:11

## 2022-01-01 RX ADMIN — HYDROCODONE BITARTRATE AND ACETAMINOPHEN 1 TABLET: 7.5; 325 TABLET ORAL at 02:11

## 2022-01-01 RX ADMIN — OCTREOTIDE ACETATE 50 MCG/HR: 500 INJECTION, SOLUTION INTRAVENOUS; SUBCUTANEOUS at 04:11

## 2022-01-01 RX ADMIN — EPINEPHRINE 1 MCG/KG/MIN: 1 INJECTION INTRAMUSCULAR; INTRAVENOUS; SUBCUTANEOUS at 08:11

## 2022-01-01 RX ADMIN — SODIUM BICARBONATE: 84 INJECTION, SOLUTION INTRAVENOUS at 08:11

## 2022-01-01 RX ADMIN — OCTREOTIDE ACETATE 50 MCG/HR: 500 INJECTION, SOLUTION INTRAVENOUS; SUBCUTANEOUS at 11:11

## 2022-01-01 RX ADMIN — VASOPRESSIN 0.04 UNITS/MIN: 20 INJECTION INTRAVENOUS at 05:11

## 2022-01-01 RX ADMIN — PANTOPRAZOLE SODIUM 8 MG/HR: 40 INJECTION, POWDER, FOR SOLUTION INTRAVENOUS at 03:11

## 2022-01-01 RX ADMIN — PANTOPRAZOLE SODIUM 80 MG: 40 INJECTION, POWDER, FOR SOLUTION INTRAVENOUS at 03:11

## 2022-01-01 RX ADMIN — OCTREOTIDE ACETATE 50 MCG/HR: 500 INJECTION, SOLUTION INTRAVENOUS; SUBCUTANEOUS at 12:11

## 2022-01-01 RX ADMIN — PANTOPRAZOLE SODIUM 8 MG/HR: 40 INJECTION, POWDER, FOR SOLUTION INTRAVENOUS at 08:11

## 2022-01-01 RX ADMIN — NOREPINEPHRINE BITARTRATE 0.3 MCG/KG/MIN: 8 INJECTION, SOLUTION INTRAVENOUS at 03:11

## 2022-01-01 RX ADMIN — SODIUM BICARBONATE: 84 INJECTION, SOLUTION INTRAVENOUS at 01:11

## 2022-01-01 RX ADMIN — Medication 10 ML: at 11:11

## 2022-01-01 RX ADMIN — PANTOPRAZOLE SODIUM 8 MG/HR: 40 INJECTION, POWDER, FOR SOLUTION INTRAVENOUS at 12:11

## 2022-01-01 RX ADMIN — NOREPINEPHRINE BITARTRATE 2.8 MCG/KG/MIN: 1 INJECTION, SOLUTION, CONCENTRATE INTRAVENOUS at 03:11

## 2022-01-01 RX ADMIN — MUPIROCIN: 20 OINTMENT TOPICAL at 09:11

## 2022-01-01 RX ADMIN — NOREPINEPHRINE BITARTRATE 1.64 MCG/KG/MIN: 1 INJECTION, SOLUTION, CONCENTRATE INTRAVENOUS at 09:11

## 2022-01-01 RX ADMIN — EPINEPHRINE 1 MCG/KG/MIN: 1 INJECTION INTRAMUSCULAR; INTRAVENOUS; SUBCUTANEOUS at 12:11

## 2022-01-01 RX ADMIN — VASOPRESSIN 0.04 UNITS/MIN: 20 INJECTION INTRAVENOUS at 03:11

## 2022-01-01 RX ADMIN — EPINEPHRINE 1 MCG/KG/MIN: 1 INJECTION INTRAMUSCULAR; INTRAVENOUS; SUBCUTANEOUS at 10:11
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
## 2022-10-03 NOTE — Clinical Note
"Humberto Hoyos" Daniel was seen and treated in our emergency department on 10/3/2022.  He may return to work on 10/05/2022.  Per Dr. Adams     If you have any questions or concerns, please don't hesitate to call.       RN    "

## 2022-10-03 NOTE — DISCHARGE INSTRUCTIONS
Follow up with dentistry.     Take antibiotic and use peridex as prescribed.     You may take ibuprofen as needed for pain.  If your pain is severe, take Norco.      Follow up with dentistry.     Return to the emergency department if any fever, worsening pain, worsening swelling, nausea, vomiting, difficulty breathing, or any other symptoms.         CLINIC ADDRESS PHONE # INSURANCE   [facility] [address] [place] [address] [phone] Medicaid/Medicare   Dr. Murtaza Sloan, DDS  [address] [place] [address] [phone] Medicaid   [facility] [address] [place] [address] [phone] Medicaid   [facility] [address] [place] [address] [phone] Medicare [facility]  [address] [place] [address] [phone] Medicaid/Medicare   [facility] [address]  [place], [place] [id number] [phone] Medicaid   [facility]  [address]  [place] [address] [phone] Medicaid for ages 5 and under; or for lip/tongue tie    Dr. Fedreico Mora, DDS [address] [place] [address] [phone] Medicaid-only for children ages 2 to 21   [facility] [address]  [place], [place] [id number] [phone] Medicaid   [facility] [address] [place], [place] [address] [phone] Medicare [facility] [address] [place] [address] [phone] Medicaid   [facility] [address] [place] [address] [phone] Medicaid-Pediatric dentistry    [facility] [address] [place] [address] [phone] Medicaid/Medicare   [facility] [address] [place] [address] [phone] Medicaid/Medicare   [facility] [address]  [place] [address]  [phone] Medicaid/Medicare   [facility] [address] [place] [address] [phone] Medicaid/Medicare   [facility] [address] [place] [address] [phone] Medicaid/Medicare   [facility] [address] [place] [address] [phone] Medicaid-Pediatric and adult   [facility] [address]  [place] [address] [phone] Medicaid   [facility] [address] [place] [address] [phone] Medicare   Dr. Lila Ansari,  [place] [address] [phone] Medicare for dentures only

## 2022-10-10 NOTE — ED PROVIDER NOTES
Encounter Date: 10/3/2022       History     Chief Complaint   Patient presents with    Dental Pain     POV with suspected abscess lower right tooth. Denies fevers at home, hasn't made it to dentist yet. Swelling and warmth to jaw.     Humberto is a 56 y/o male presents for R lower dental pain, states tooth infected, has not seen dentist.        Dental Pain  The primary symptoms include mouth pain and dental injury. Primary symptoms do not include fever, shortness of breath or sore throat. The symptoms began two days ago. The symptoms are worsening. The symptoms are new. The symptoms occur frequently.   Affected locations include: teeth. At its highest the mouth pain was at 10/10.   His tetanus status is up to date.   Additional symptoms include: gum swelling and facial swelling. Additional symptoms do not include: trouble swallowing, pain with swallowing, excessive salivation and dry mouth.   Review of patient's allergies indicates:  No Known Allergies  History reviewed. No pertinent past medical history.  History reviewed. No pertinent surgical history.  History reviewed. No pertinent family history.     Review of Systems   Constitutional:  Negative for fever.   HENT:  Positive for dental problem and facial swelling. Negative for sore throat and trouble swallowing.    Eyes:  Negative for visual disturbance.   Respiratory:  Negative for shortness of breath.    Cardiovascular:  Negative for chest pain.   Gastrointestinal:  Negative for abdominal pain.   Genitourinary:  Negative for dysuria.   Musculoskeletal:  Negative for joint swelling.   Skin:  Negative for rash.   Neurological:  Negative for weakness.   Psychiatric/Behavioral:  Negative for confusion.    All other systems reviewed and are negative.    Physical Exam     Initial Vitals [10/03/22 0737]   BP Pulse Resp Temp SpO2   (!) 166/78 82 18 99 °F (37.2 °C) 97 %      MAP       --         Physical Exam    Nursing note and vitals reviewed.  Constitutional: He  appears well-developed and well-nourished. He is not diaphoretic. No distress.   HENT:   Head: Normocephalic and atraumatic.   R lower molar decayed to root with surrounding gingival erythema.  No obvious abscess noted.     Eyes: Conjunctivae and EOM are normal. Pupils are equal, round, and reactive to light.   Neck:   Normal range of motion.  Cardiovascular:  Normal rate, regular rhythm, normal heart sounds and intact distal pulses.           No murmur heard.  Pulmonary/Chest: Breath sounds normal. No respiratory distress. He has no wheezes. He has no rales.   Abdominal: Abdomen is soft. He exhibits no distension. There is no abdominal tenderness.   Musculoskeletal:         General: No tenderness or edema. Normal range of motion.      Cervical back: Normal range of motion.     Neurological: He is alert and oriented to person, place, and time. No cranial nerve deficit.   Skin: Skin is warm and dry. Capillary refill takes less than 2 seconds. No rash noted. No erythema.   Psychiatric: He has a normal mood and affect.       ED Course   Procedures  Labs Reviewed - No data to display       Imaging Results    None          Medications - No data to display  Medical Decision Making:   ED Management:  Patient is a 56 y/o presents for dental pain.  See HPI/exam.  Discussed need for f/u with dentist. Reassessed patient.  Patient is resting comfortably.  Discussed all results.  Discussed need for follow-up.  Discussed return precautions.  Answered all questions at this time.  Hemodynamically stable for continued outpatient management with strict return precautions.  Patient verbalized understanding agreed to plan.                          Clinical Impression:   Final diagnoses:  [K08.89] Pain, dental (Primary)  [K04.7] Dental abscess  [K02.9] Pain due to dental caries        ED Disposition Condition    Discharge Stable          ED Prescriptions       Medication Sig Dispense Start Date End Date Auth. Provider     amoxicillin-clavulanate 875-125mg (AUGMENTIN) 875-125 mg per tablet (Expires today) Take 1 tablet by mouth every 12 (twelve) hours. for 7 days 14 tablet 10/3/2022 10/10/2022 Dell Adams MD    chlorhexidine (PERIDEX) 0.12 % solution Use as directed 15 mLs in the mouth or throat 2 (two) times daily. for 14 days 473 mL 10/3/2022 10/17/2022 Dell Adams MD    ibuprofen (ADVIL,MOTRIN) 600 MG tablet () Take 1 tablet (600 mg total) by mouth every 6 (six) hours as needed for Pain. 15 tablet 10/3/2022 10/8/2022 Dell Adams MD    HYDROcodone-acetaminophen (NORCO) 5-325 mg per tablet () Take 1 tablet by mouth every 8 (eight) hours as needed for Pain. 15 tablet 10/3/2022 10/8/2022 Dell Adams MD          Follow-up Information    None          Dell Adams MD  10/10/22 1000
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
## 2022-11-09 NOTE — Clinical Note
"Humberto Sanchez (Keith) was seen and treated in our emergency department on 11/9/2022.  He may return to work on 11/13/2022.       If you have any questions or concerns, please don't hesitate to call.       RN    "

## 2022-11-09 NOTE — Clinical Note
"Humberto Sanchez (Keith) was seen and treated in our emergency department on 11/9/2022.  He may return to work on 11/11/2022.       If you have any questions or concerns, please don't hesitate to call.       RN    "
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
## 2022-11-14 NOTE — Clinical Note
Diagnosis: Cardiac arrest [427.5.ICD-9-CM]   Admitting Provider:: GIBRAN GALLARDO [[id number]]   Future Attending Provider: GIBRAN GALLARDO [[id number]]   Reason for IP Medical Treatment  (Clinical interventions that can only be accomplished in the IP setting? ) :: post cardiac arrest   Estimated Length of Stay:: 5+ midnights   I certify that Inpatient services for greater than or equal to 2 midnights are medically necessary:: No   Plans for Post-Acute care--if anticipated (pick the single best option):: A. No post acute care anticipated at this time

## 2022-11-14 NOTE — H&P
[facility] - Emergency Dept  Pulmonary Critical Care Note    Patient Name: Humberto Sanchez  MRN: [id number]  Admission Date: 11/14/2022  Hospital Length of Stay: 0 days  Code Status: No Order  Attending Provider: Hubert Samson MD  Primary Care Provider: Primary Doctor No     Subjective:     HPI: This is a 57 year old male with a PMH significant for HTN and cocaine abuse who presented to the ED via EMS after cardiac arrest. Wife reports at approximately 1421 patient was talking to wife and suddenly became unresponsive and slowly collapsed from a seated seated position. She noticed a white powder substance underneath his nose. EMS began CPR at 1435 and administered 7 rounds of epi within 3-4mins of ROSC approximately 20mins prior to ED arrival.     In the ED  found to be hypoxic with 79% on ambu bag and hypotensive 58/32. Received 1 epi, 1 bicarb ROSC at 1527, lost pulse at 1541 got 1 bicarb, 1 epi 1 calcium ROSC achieved at 1543. Intubate and initiated on Levophed. Notable labs ALT 2898, AST 2537, Etoh 28, UDS postive for benzo and cocaine, PT 50, INR 5.71. CXR No pneumothorax, ET tube and enteric tube well positioned. CT head and A&P pending. Admit to ICU      Hospital Course/Significant events:      24 Hour Interval History:      No past medical history on file.    Social History     Socioeconomic History    Marital status:            Objective:     Current Outpatient Medications   Medication Instructions    HYDROcodone-acetaminophen (NORCO) 5-325 mg per tablet 1 tablet, Oral, Every 6 hours PRN    ketorolac (TORADOL) 10 mg, Oral, Every 6 hours       Current Inpatient Medications   mupirocin   Nasal BID           Intake/Output Summary (Last 24 hours) at 11/14/2022 1654  Last data filed at 11/14/2022 1544  Gross per 24 hour   Intake 300 ml   Output --   Net 300 ml       Review of Systems   Unable to perform ROS: Patient unresponsive      Vital Signs (Most Recent):  Temp: (!) 92.3 °F (33.5 °C)  (11/14/22 1620)  Pulse: 81 (11/14/22 1648)  Resp: (!) 33 (11/14/22 1648)  BP: (!) 213/99 (11/14/22 1648)  SpO2: 98 % (11/14/22 1648)    Body mass index is 25.82 kg/m².  Weight: 72.6 kg (160 lb) Vital Signs (24h Range):  Temp:  [92.3 °F (33.5 °C)-96.3 °F (35.7 °C)] 92.3 °F (33.5 °C)  Pulse:  [64-92] 81  Resp:  [13-33] 33  SpO2:  [79 %-98 %] 98 %  BP: ()/(27-99) 213/99     Physical Exam  Vitals and nursing note reviewed. Exam conducted with a chaperone present.   Constitutional:       Comments: Sedated and mechanically ventilated    HENT:      Head: Normocephalic and atraumatic.   Eyes:      Pupils:      Right eye: Pupil is not reactive.      Left eye: Pupil is not reactive.      Comments: Pupils fixed and dilated   Cardiovascular:      Rate and Rhythm: Regular rhythm. Bradycardia present.      Pulses: No decreased pulses.           Femoral pulses are 1+ on the right side and 0 on the left side.     Heart sounds: Heart sounds are distant. No murmur heard.  Pulmonary:      Comments: Intubated  Abdominal:      Palpations: Abdomen is soft.      Comments: OG in place with bloody output   Genitourinary:     Comments: Gamez in place   Musculoskeletal:      Right lower leg: No edema.      Left lower leg: No edema.   Skin:     General: Skin is warm and dry.   Neurological:      Mental Status: He is unresponsive.      GCS: GCS eye subscore is 1. GCS verbal subscore is 1. GCS motor subscore is 1.      Comments: Absent pupillary, corneal gag and cough reflexes. Doesn't withdraw to painful stimuli         Mechanical ventilation support:  Vent Mode: SIMV (11/14/22 1535)  Ventilator Initiated: Yes (11/14/22 1535)  Set Rate: 33 BPM (11/14/22 1535)  Vt Set: 500 mL (11/14/22 1535)  Pressure Support: 10 cmH20 (11/14/22 1535)  PEEP/CPAP: 8 cmH20 (11/14/22 1535)  Peak Airway Pressure: 25 cmH2O (11/14/22 1535)  Total Ve: 12.5 L/m (11/14/22 1535)  F/VT Ratio<105 (RSBI): (!) 45.98 (11/14/22 1535)    Lines/Drains/Airways       Drain   Duration                  NG/OG Tube 11/14/22 1530 orogastric 18 Fr. Right mouth <1 day         Urethral Catheter 11/14/22 1535 Silicone 16 Fr. <1 day              Airway  Duration                  Airway - Non-Surgical 11/14/22 1528 Endotracheal Tube <1 day              Peripheral Intravenous Line  Duration                  Peripheral IV - Single Lumen 11/14/22 1542 18 G Anterior;Right Hand <1 day         Peripheral IV - Single Lumen 11/14/22 18 G Anterior;Left Hand <1 day         Peripheral IV - Single Lumen 11/14/22 18 G Left Antecubital <1 day         Peripheral IV - Single Lumen 11/14/22 18 G Right Antecubital <1 day                    Significant Labs:    Lab Results   Component Value Date    WBC 17.5 (H) 11/14/2022    HGB 12.7 (L) 11/14/2022    HCT 42 11/14/2022    .2 (H) 11/14/2022     11/14/2022         BMP  Lab Results   Component Value Date     11/14/2022    K 3.8 11/14/2022    CO2 15 (L) 11/14/2022    BUN 9.7 11/14/2022    CREATININE 1.33 (H) 11/14/2022    CALCIUM 8.6 11/14/2022       ABG  Recent Labs   Lab 11/14/22  1548   PH 6.93*   PO2 243*   PCO2 64*   HCO3 13.4*           Significant Imaging:  I have reviewed all pertinent imaging within the past 24 hours.        Assessment/Plan:     Assessment  Out of Hospital Cardiac Arrest most probably secondary to toxins (cocaine, benzos, etoh)  Acute Hypoxic Respiratory Failure Requiring Intubation  Shock, unknown etiology  Polysubstance abuse  Hx of HTN      Plan  Admit to ICU  Initiating Cooling protocol  Continue Levophed  Follow up labs and imaging       Greater than 30 minutes of critical care was time spent personally by me on the following activities: development of treatment plan with patient or surrogate and bedside caregivers, discussions with consultants, evaluation of patient's response to treatment, examination of patient, ordering and performing treatments and interventions, ordering and review of laboratory studies, ordering  and review of radiographic studies, pulse oximetry, re-evaluation of patient's condition.  This critical care time did not overlap with that of any other provider or involve time for any procedures.     Taisha Barnes MD  Pulmonary Critical Care Medicine  [facility] - Emergency Dept

## 2022-11-15 NOTE — PROCEDURES
Arterial Catheter Insertion Procedure Note     Procedure: Insertion of Arterial Catheter     Indications: Hemodynamic Monitoring     Procedure Details     Maximum sterile technique was used including antiseptics, sterile gloves,hand hygiene, mask and sterile maximum barrier drape.     Under sterile conditions the skin above the Right Radial  artery was prepped with Chloroprep and covered with a sterile drape. Local anesthesia was applied to the skin and subcutaneous tissues. Ultrasound guidance was used to identify the artery. A 20 -gauge catheter over a needle was then inserted into the artery. A guide wire was then passed easily through the needle. The needle was then withdrawn. A arterial catheter was then inserted into the vessel over the guide wire. The needle was then withdrawn and was connected to the monitor to ensure a proper waveform. The catheter was sutured into place and a sterile dressing was applied.     Ultrasound/Sonosite was used during the procedure.      Estimated blood loss: 10 ml    Tomi Jeffries DO  11/14/2022

## 2022-11-15 NOTE — PROVIDER PROGRESS NOTES - EMERGENCY DEPT.
Encounter Date: 11/14/2022    ED Physician Progress Notes        Physician Note:              Critical care    Date/Time: 11/15/2022 12:13 AM  Performed by: Lore Burgos MD  Authorized by: Lore Burgos MD   Direct patient critical care time: 35 minutes  Total critical care time (exclusive of procedural time) : 35 minutes  Critical care time was exclusive of separately billable procedures and treating other patients and teaching time.  Critical care was necessary to treat or prevent imminent or life-threatening deterioration of the following conditions: circulatory failure, CNS failure or compromise and cardiac failure.  Critical care was time spent personally by me on the following activities: discussions with primary provider, examination of patient, obtaining history from patient or surrogate, ordering and performing treatments and interventions, pulse oximetry, re-evaluation of patient's condition and review of old charts.    Code blue was called in the ICU.  Briefly, patient is a 57-year-old male with history of HTN and cocaine use per chart review, COVID+ today, and GI bleed, who presented in cardiac arrest earlier today.  Resuscitative efforts were performed for approximately 1 hour with eventual ROSC.  He is currently in the ICU intubated, maxed out on Levophed and vasopressin.  CT head shows loss of gray-white differentiation concerning for global edema.  Liver enzymes and troponin are markedly elevated. On my arrival, CPR in progress. He was being ventilated easily through the ETT with an ambu bag, femoral pulse palpable during compressions. Rhythm was PEA. He had received 3 doses of epinephrine, no defibrillation.     After a fourth dose of epinephrine and an amp of bicarb, he did regain a strong femoral pulse, BP 170s/90s, oxygen saturation 95%. House officer unable to contact family but will continue to try to update them on his current status. ICU attending notified. Prognosis very  poor.    Lore Burgos MD 12:29 AM

## 2022-11-15 NOTE — NURSING
Nurses Note -- 4 Eyes      11/14/2022   11:18 PM      Skin assessed during: Admit      [x] No Pressure Injuries Present    [x]Prevention Measures Documented      [] Yes- Altered Skin Integrity Present or Discovered   [] LDA Added if Not in Epic (Describe Wound)   [] New Altered Skin Integrity was Present on Admit and Documented in LDA   [] Wound Image Taken    Wound Care Consulted? No    Attending Nurse:  Dottie Layton RN     Second RN/Staff Member:  Hailey Yates RN

## 2022-11-15 NOTE — PROCEDURES
"Humberto Sanchez is a 57 y.o. male patient.    Temp: (!) 93 °F (33.9 °C) (11/14/22 2011)  Pulse: 88 (11/14/22 2245)  Resp: (!) 34 (11/14/22 2245)  BP: (!) 77/63 (11/14/22 2215)  SpO2: 97 % (11/14/22 2245)  Weight: 96 kg (211 lb 10.3 oz) (11/14/22 2011)  Height: 5' 6" (167.6 cm) (11/14/22 2011)    PICC  Date/Time: 11/14/2022 11:05 PM  Performed by: Fernie Acosta RN  Consent Done: Yes  Time out: Immediately prior to procedure a time out was called to verify the correct patient, procedure, equipment, support staff and site/side marked as required  Indications: med administration  Anesthesia: local infiltration  Local anesthetic: lidocaine 1% without epinephrine  Anesthetic Total (mL): 5  Preparation: skin prepped with ChloraPrep  Skin prep agent dried: skin prep agent completely dried prior to procedure  Sterile barriers: all five maximum sterile barriers used - cap, mask, sterile gown, sterile gloves, and large sterile sheet  Hand hygiene: hand hygiene performed prior to central venous catheter insertion  Location details: right basilic  Catheter type: triple lumen  Catheter size: 5 Fr  Catheter Length: 39cm    Ultrasound guidance: yes  Vessel Caliber: large and patent, compressibility normal  Vascular Doppler: not done  Needle advanced into vessel with real time Ultrasound guidance.  Guidewire confirmed in vessel.  Sterile sheath used.  no esophageal manometryNumber of attempts: 1  Post-procedure: blood return through all ports, chlorhexidine patch and sterile dressing applied  Technical procedures used: modified seldinger    Assessment: placement verified by x-ray  Complications: none  Comments: Post arrest        Fernie Acosta RN  11/14/2022    "

## 2022-11-15 NOTE — PLAN OF CARE
Chart review completed. Poor prognosis noted, pending family decision re: GOC. Not appropriate for discharge planning assessment at this time. CM will remain available.     Gaurav Garcia RN Case Manager

## 2022-11-15 NOTE — PROGRESS NOTES
[facility] - Emergency Dept  Pulmonary Critical Care Note    Patient Name: Humberto Sanchez  MRN: [id number]  Admission Date: 11/14/2022  Hospital Length of Stay: 1 days  Code Status: Full Code  Attending Provider: Hubert Samson MD  Primary Care Provider: Primary Doctor No     Subjective:     HPI: This is a 57 year old male with a PMH significant for HTN and cocaine abuse who presented to the ED via EMS after cardiac arrest. Wife reports at approximately 1421 on 11/14 patient was talking to wife and suddenly became unresponsive and slowly collapsed from a seated position. She noticed a white powder substance underneath his nose. EMS began CPR at 1435 and administered 7 rounds of epi within 3-4mins of ROSC approximately 20mins prior to ED arrival.     In the ED  found to be hypoxic with 79% on ambu bag and hypotensive 58/32. Received 1 epi, 1 bicarb ROSC at 1527, lost pulse at 1541 got 1 bicarb, 1 epi 1 calcium ROSC achieved at 1543. Intubate and initiated on Levophed. Notable labs ALT 2898, AST 2537, Etoh 28, UDS postive for benzo and cocaine, PT 50, INR 5.71. CXR No pneumothorax, ET tube and enteric tube well position. Admitted to ICU      Hospital Course/Significant events:  11/15 Code Blue    24 Hour Interval History:  CT head revealed loss of gray-white matter differentiation concerning for global edema. CT A&P bilateral pulmonary edema, retrosternal hematoma and multiple rib fractures consistent with prolonged resuscitative efforts, dilated loops of bowel consistent with ileus. Elevated cardiac enzymes to 195 and lactic acid 18.6. PICC and Deysi placed. Overnight patient had Code blue with PEA requiring 4 doses of epi and an amp of bicarb before regaining a femoral pulse. Family in deliberation over goals of care.    History reviewed. No pertinent past medical history.    Social History     Socioeconomic History    Marital status:            Objective:     Current Outpatient Medications    Medication Instructions    HYDROcodone-acetaminophen (NORCO) 5-325 mg per tablet 1 tablet, Oral, Every 6 hours PRN       Current Inpatient Medications   enoxaparin  40 mg Subcutaneous Daily    EPINEPHrine (PF)        lactated ringers  1,000 mL Intravenous Once    mupirocin   Nasal BID    sodium bicarbonate        sodium chloride 0.9%  10 mL Intravenous Q6H           Intake/Output Summary (Last 24 hours) at 11/15/2022 0924  Last data filed at 11/15/2022 0800  Gross per 24 hour   Intake 5306 ml   Output 570 ml   Net 4736 ml         Review of Systems   Unable to perform ROS: Patient unresponsive      Vital Signs (Most Recent):  Temp: 97.7 °F (36.5 °C) (11/15/22 0854)  Pulse: 72 (11/15/22 0845)  Resp: (!) 29 (11/15/22 0845)  BP: 103/78 (11/15/22 0832)  SpO2: (!) 91 % (11/15/22 0845)    Body mass index is 34.16 kg/m².  Weight: 96 kg (211 lb 10.3 oz) Vital Signs (24h Range):  Temp:  [92.1 °F (33.4 °C)-97.7 °F (36.5 °C)] 97.7 °F (36.5 °C)  Pulse:  [51-94] 72  Resp:  [13-36] 29  SpO2:  [71 %-100 %] 91 %  BP: ()/() 103/78  Arterial Line BP: ()/(23-82) 103/67     Physical Exam  Vitals and nursing note reviewed. Exam conducted with a chaperone present.   Constitutional:       Comments: Unresponsive and mechanically ventilated    HENT:      Head: Normocephalic and atraumatic.   Eyes:      Pupils:      Right eye: Pupil is not reactive.      Left eye: Pupil is not reactive.      Comments: Pupils fixed and dilated   Cardiovascular:      Rate and Rhythm: Regular rhythm. Bradycardia present.      Pulses: No decreased pulses.           Femoral pulses are 0 on the right side and 1+ on the left side.     Heart sounds: Heart sounds are distant. No murmur heard.  Pulmonary:      Comments: Intubated  Abdominal:      General: There is distension.      Palpations: Abdomen is rigid.      Comments: OG in place with bloody output   Genitourinary:     Comments: Gamez in place   Musculoskeletal:      Right lower leg: No  edema.      Left lower leg: No edema.   Skin:     General: Skin is warm and dry.   Neurological:      Mental Status: He is unresponsive.      GCS: GCS eye subscore is 1. GCS verbal subscore is 1. GCS motor subscore is 1.      Comments: Absent pupillary, corneal, gag and cough reflexes. Doesn't withdraw to painful stimuli         Mechanical ventilation support:  Vent Mode: SIMV (11/15/22 0746)  Ventilator Initiated: Yes (11/14/22 1535)  Set Rate: 30 BPM (11/15/22 0746)  Vt Set: 500 mL (11/15/22 0746)  Pressure Support: 10 cmH20 (11/15/22 0746)  PEEP/CPAP: 8 cmH20 (11/15/22 0746)  Oxygen Concentration (%): 60 (11/15/22 0800)  Peak Airway Pressure: 28 cmH2O (11/15/22 0746)  Total Ve: 12.3 L/m (11/15/22 0746)  F/VT Ratio<105 (RSBI): (!) 74.81 (11/15/22 0746)    Lines/Drains/Airways       Peripherally Inserted Central Catheter Line  Duration             PICC Triple Lumen 11/14/22 2304 right basilic <1 day              Drain  Duration                  NG/OG Tube 11/14/22 1530 orogastric 18 Fr. Right mouth <1 day         Urethral Catheter 11/14/22 1535 Silicone 16 Fr. <1 day              Airway  Duration                  Airway - Non-Surgical 11/14/22 1528 Endotracheal Tube <1 day              Peripheral Intravenous Line  Duration                  Peripheral IV - Single Lumen 11/14/22 18 G Anterior;Left Hand 1 day         Peripheral IV - Single Lumen 11/14/22 18 G Left Antecubital 1 day         Peripheral IV - Single Lumen 11/14/22 18 G Right Antecubital 1 day         Peripheral IV - Single Lumen 11/14/22 1542 18 G Anterior;Right Hand <1 day                    Significant Labs:    Lab Results   Component Value Date    WBC 26.1 (H) 11/15/2022    HGB 11.1 (L) 11/15/2022    HCT 36.1 (L) 11/15/2022    .6 (H) 11/15/2022     11/15/2022         BMP  Lab Results   Component Value Date     11/15/2022    K 4.2 11/15/2022    CO2 8 (LL) 11/15/2022    BUN 17.7 11/15/2022    CREATININE 2.10 (H) 11/15/2022    CALCIUM  7.1 (L) 11/15/2022       ABG  Recent Labs   Lab 11/15/22  0106   PH 6.92*   PO2 142*   PCO2 34*   HCO3 7.0*             Significant Imaging:  I have reviewed all pertinent imaging within the past 24 hours.        Assessment/Plan:     Assessment  Out of Hospital Cardiac Arrest most probably secondary to toxins (cocaine, benzos, etoh)  Acute Hypoxic Respiratory Failure Requiring Intubation  Shock, unknown etiology  Lactic acidosis  Polysubstance abuse  Hx of HTN      Plan  Continue supportive care. He is on the maximum doses of Epinephrine, Vasopressin and Levophed.   Continuing cooling protocol  Discussed with Wife, that patient has no signs of neurological response and options moving forward. She is  awaiting family visitation before deciding on withdrawal of care vs NM brain imaging study.  Follow up labs        Greater than 30 minutes of critical care was time spent personally by me on the following activities: development of treatment plan with patient or surrogate and bedside caregivers, discussions with consultants, evaluation of patient's response to treatment, examination of patient, ordering and performing treatments and interventions, ordering and review of laboratory studies, ordering and review of radiographic studies, pulse oximetry, re-evaluation of patient's condition.  This critical care time did not overlap with that of any other provider or involve time for any procedures.     Taisha Barnes MD  Pulmonary Critical Care Medicine  [facility] - Emergency Dept

## 2022-11-15 NOTE — SIGNIFICANT EVENT
After finishing arterial line placement, blood pressure noted to be 34/28 on arterial line. This pressure continued to drop and eventually patient lost pulses.  Chest compressions were started at that time.  Patient received epinephrine x3 and 1 amp of bicarb before ROSC was obtained.  Patient remains intubated and on 2 vasopressors , currently requiring maximal hemodynamic support at this time.  Attempted to call family to update them of situation ([phone]).  They did not answer after 4 calls, will continue to try to reach out.  Updated staff physician on-call.  Prognosis still remains extremely guarded    Tomi Jeffries DO  [employer] IM PGY2

## 2022-11-15 NOTE — DISCHARGE SUMMARY
Death Note    Patient Name: Humberto Sanchez  Patient : 1965  Patient MRN: [id number]    Called by nursing staff at 1408 to see the patient. Upon my arrival, Humberto Sanchez was noted to be apneic and unresponsive to verbal, tactile, or painful stimuli. ACLS was not initiated due to DNR status. He was not moving their extremities spontaneously. Heart sounds and breath sounds are absent to auscultation. Bilateral carotid, radial, and femoral pulses were absent to palpation. Pupils fixed and dilated, corneal reflex absent bilaterally. He was pronounced dead at 1414 on 11/15/2022. Family was at bedside. Attending was notified.    Time of Death: 1406    Cause of Death: Cardiac arrest secondary to drug overdose    Taisha Barnes MD  [employer] FM PGY-2

## 2022-11-15 NOTE — NURSING
Patient was maxed on multiple BP drips throughout morning. Spoke with family at 9:00 meeting on plan of care with Dr. Dos Santos at bedside giving them their options for the patient's goal of care. The family decided to come back at 13:00 and make a decision then. When the family aririved for the 13:00 visitation they had announced that they would like to transition care to palliative withdrawal to Dr. Dos Santos. The family members were all allowed to switch in and out and visit with the patient and the family's  was let up to pray with the family prior to withdrawal of care. After everyone had seen the patient, we withdrew care on the patient. The family was then allowed to see him again.  I called the , [facility], at 14:10 to get the body released. I spoke with MAICOL at 14:20 to let them know the time of death for the patient. The wife, Chelsie, told me that she took home his belongings earlier and then signed the paperwork to release the [place] to the [facility]. Chelsie told me they chose [facility]  Home of [place], [place]. I called house supervisor made him aware that I had not called the  home yet per [facility] requests. The patient's lines, etc were removed and post mortem care was performed. The patient was transported to the [facility] around 15:30.

## 2022-11-18 LAB
ABO + RH BLD: NORMAL
BLD PROD TYP BPU: NORMAL
BLOOD UNIT EXPIRATION DATE: NORMAL
BLOOD UNIT TYPE CODE: 5100
BLOOD UNIT TYPE CODE: 600
BLOOD UNIT TYPE CODE: 600
CROSSMATCH INTERPRETATION: NORMAL
DISPENSE STATUS: NORMAL
UNIT NUMBER: NORMAL